# Patient Record
(demographics unavailable — no encounter records)

---

## 2025-02-13 NOTE — REVIEW OF SYSTEMS
[Dyspnea on Exertion] : dyspnea on exertion [Negative] : Psychiatric [Shortness Of Breath] : no shortness of breath [Wheezing] : no wheezing [Cough] : no cough

## 2025-02-13 NOTE — PHYSICAL EXAM
[No Acute Distress] : no acute distress [Well Nourished] : well nourished [Well Developed] : well developed [Well-Appearing] : well-appearing [No Respiratory Distress] : no respiratory distress  [No Accessory Muscle Use] : no accessory muscle use [Normal Rate] : normal rate  [Regular Rhythm] : with a regular rhythm [Normal S1, S2] : normal S1 and S2 [No Murmur] : no murmur heard [Pedal Pulses Present] : the pedal pulses are present [Normal Gait] : normal gait [Coordination Grossly Intact] : coordination grossly intact [No Focal Deficits] : no focal deficits [Speech Grossly Normal] : speech grossly normal [Normal Affect] : the affect was normal [Alert and Oriented x3] : oriented to person, place, and time [Normal Mood] : the mood was normal [Normal Insight/Judgement] : insight and judgment were intact [de-identified] : Rhinophyma on nose [de-identified] : Scattered wheezing throughout [de-identified] : Trace edema to bilat LE

## 2025-02-13 NOTE — HISTORY OF PRESENT ILLNESS
[Post-hospitalization from ___ Hospital] : Post-hospitalization from [unfilled] Hospital [Admitted on: ___] : The patient was admitted on [unfilled] [Discharge Summary] : discharge summary [Discharge Med List] : discharge medication list [Med Reconciliation] : medication reconciliation has been completed [Patient Contacted By: ____] : and contacted by [unfilled] [FreeTextEntry2] : Copied from EMR, "Hospital Course 66-year-old male with a PMH of DM, HTN, CVA, CHF, CAD w/ AICD, skin cancer (face) presents to the ED to be evaluated for Dyspnea. Endorses 1 week history of MAHARAJ progressively getting worse over the past 3 days with dyspnea occurring at rest associated with orthopnea leading to difficulty sleeping. Furosemide was initiated for heart failure with echocardiogram noting severely decreased left ventricular systolic function. Respiratory viral panel was positive for influenza and oseltamivir was initiated. He completed oseltamivir. He was seen by Cardiology and started on IV diuretics. He was then transitioned to oral furosemide. He was seen by EP and his AICD was reprogrammed. Patient instructed to follow up with his PCP [facility]. Medications were sent to [facility] for 1 month and he is instructed to [facility] refills from [facility]."  Pt seen for transitional care management at his sober living home. He reports no edema, MAHARAJ, not worse, no acute SOB, fever, chills, cough. He continues to smoke and reports it is difficult to stop. He uses albuterol inhaler 2 puffs prior to bedtime.

## 2025-02-13 NOTE — HISTORY OF PRESENT ILLNESS
[Post-hospitalization from ___ Hospital] : Post-hospitalization from [unfilled] Hospital [Admitted on: ___] : The patient was admitted on [unfilled] [Discharge Summary] : discharge summary [Discharge Med List] : discharge medication list [Med Reconciliation] : medication reconciliation has been completed [Patient Contacted By: ____] : and contacted by [unfilled] [FreeTextEntry2] : Copied from EMR, "Hospital Course 66-year-old male with a PMH of DM, HTN, CVA, CHF, CAD w/ AICD, skin cancer (face) presents to the ED to be evaluated for Dyspnea. Endorses 1 week history of MAHARAJ progressively getting worse over the past 3 days with dyspnea occurring at rest associated with orthopnea leading to difficulty sleeping. Furosemide was initiated for heart failure with echocardiogram noting severely decreased left ventricular systolic function. Respiratory viral panel was positive for influenza and oseltamivir was initiated. He completed oseltamivir. He was seen by Cardiology and started on IV diuretics. He was then transitioned to oral furosemide. He was seen by EP and his AICD was reprogrammed. Patient instructed to follow up with his PCP and [facility]. Medications were sent to [facility] pharmacy for 1 month and he is instructed [facility] ref[facility]."  Pt seen for transitional care management at his sober living home. He reports no edema, MAAHRAJ, not worse, no acute SOB, fever, chills, cough. He continues to smoke and reports it is difficult to stop. He uses albuterol inhaler 2 puffs prior to bedtime.

## 2025-02-13 NOTE — PHYSICAL EXAM
[No Acute Distress] : no acute distress [Well Nourished] : well nourished [Well Developed] : well developed [Well-Appearing] : well-appearing [No Respiratory Distress] : no respiratory distress  [No Accessory Muscle Use] : no accessory muscle use [Normal Rate] : normal rate  [Regular Rhythm] : with a regular rhythm [Normal S1, S2] : normal S1 and S2 [No Murmur] : no murmur heard [Pedal Pulses Present] : the pedal pulses are present [Normal Gait] : normal gait [Coordination Grossly Intact] : coordination grossly intact [No Focal Deficits] : no focal deficits [Speech Grossly Normal] : speech grossly normal [Normal Affect] : the affect was normal [Alert and Oriented x3] : oriented to person, place, and time [Normal Mood] : the mood was normal [Normal Insight/Judgement] : insight and judgment were intact [de-identified] : Rhinophyma on nose [de-identified] : Scattered wheezing throughout [de-identified] : Trace edema to bilat LE

## 2025-02-13 NOTE — PLAN
[FreeTextEntry1] : pt missed card appt on Monday as the van leaves early for appointments and he wasn't woken up.  Rescheduled for 5/19